# Patient Record
Sex: FEMALE | Race: WHITE | NOT HISPANIC OR LATINO | Employment: OTHER | URBAN - METROPOLITAN AREA
[De-identification: names, ages, dates, MRNs, and addresses within clinical notes are randomized per-mention and may not be internally consistent; named-entity substitution may affect disease eponyms.]

---

## 2017-10-12 ENCOUNTER — FOLLOW UP (OUTPATIENT)
Dept: URBAN - METROPOLITAN AREA CLINIC 26 | Facility: CLINIC | Age: 82
End: 2017-10-12

## 2017-10-12 VITALS — BODY MASS INDEX: 32.39 KG/M2 | HEIGHT: 60 IN | WEIGHT: 165 LBS

## 2017-10-12 DIAGNOSIS — H01.003: ICD-10-CM

## 2017-10-12 DIAGNOSIS — H02.833: ICD-10-CM

## 2017-10-12 DIAGNOSIS — H02.836: ICD-10-CM

## 2017-10-12 DIAGNOSIS — D23.11: ICD-10-CM

## 2017-10-12 DIAGNOSIS — H40.023: ICD-10-CM

## 2017-10-12 DIAGNOSIS — H01.006: ICD-10-CM

## 2017-10-12 DIAGNOSIS — D31.32: ICD-10-CM

## 2017-10-12 DIAGNOSIS — H35.3112: ICD-10-CM

## 2017-10-12 DIAGNOSIS — H04.123: ICD-10-CM

## 2017-10-12 DIAGNOSIS — H35.373: ICD-10-CM

## 2017-10-12 PROCEDURE — 92250 FUNDUS PHOTOGRAPHY W/I&R: CPT

## 2017-10-12 PROCEDURE — 92014 COMPRE OPH EXAM EST PT 1/>: CPT

## 2017-10-12 ASSESSMENT — VISUAL ACUITY
OS_SC: 20/50-2
OS_PH: 20/25-1
OD_SC: 20/25-2

## 2017-10-12 ASSESSMENT — TONOMETRY
OD_IOP_MMHG: 20
OS_IOP_MMHG: 20

## 2018-02-13 ENCOUNTER — APPOINTMENT (RX ONLY)
Dept: URBAN - METROPOLITAN AREA CLINIC 16 | Facility: CLINIC | Age: 83
Setting detail: DERMATOLOGY
End: 2018-02-13

## 2018-02-13 DIAGNOSIS — L82.1 OTHER SEBORRHEIC KERATOSIS: ICD-10-CM

## 2018-02-13 DIAGNOSIS — D485 NEOPLASM OF UNCERTAIN BEHAVIOR OF SKIN: ICD-10-CM

## 2018-02-13 PROBLEM — D48.5 NEOPLASM OF UNCERTAIN BEHAVIOR OF SKIN: Status: ACTIVE | Noted: 2018-02-13

## 2018-02-13 PROCEDURE — ? REFERRAL

## 2018-02-13 PROCEDURE — ? COUNSELING

## 2018-02-13 PROCEDURE — ? PRESCRIPTION

## 2018-02-13 PROCEDURE — 99202 OFFICE O/P NEW SF 15 MIN: CPT

## 2018-02-13 RX ORDER — MUPIROCIN 20 MG/G
OINTMENT TOPICAL
Qty: 1 | Refills: 0 | Status: ERX | COMMUNITY
Start: 2018-02-13

## 2018-02-13 RX ADMIN — MUPIROCIN: 20 OINTMENT TOPICAL at 20:11

## 2018-02-13 ASSESSMENT — LOCATION SIMPLE DESCRIPTION DERM
LOCATION SIMPLE: RIGHT ZYGOMA
LOCATION SIMPLE: LEFT CHEEK

## 2018-02-13 ASSESSMENT — LOCATION ZONE DERM: LOCATION ZONE: FACE

## 2018-02-13 ASSESSMENT — LOCATION DETAILED DESCRIPTION DERM
LOCATION DETAILED: LEFT INFERIOR CENTRAL MALAR CHEEK
LOCATION DETAILED: RIGHT CENTRAL ZYGOMA

## 2018-02-13 NOTE — HPI: EVALUATION OF SKIN LESION(S)
How Severe Are Your Spot(S)?: mild
Have Your Spot(S) Been Treated In The Past?: has not been treated
Hpi Title: Evaluation of a Skin Lesion
Additional History: Pt was prescribed a topical  from Dr. Jorge Ramirez. but canât remember what it was called.

## 2018-11-06 ENCOUNTER — FOLLOW UP (OUTPATIENT)
Dept: URBAN - METROPOLITAN AREA CLINIC 26 | Facility: CLINIC | Age: 83
End: 2018-11-06

## 2018-11-06 VITALS — HEIGHT: 60 IN | BODY MASS INDEX: 33.77 KG/M2 | WEIGHT: 172 LBS

## 2018-11-06 DIAGNOSIS — D23.112: ICD-10-CM

## 2018-11-06 DIAGNOSIS — D31.32: ICD-10-CM

## 2018-11-06 DIAGNOSIS — H35.373: ICD-10-CM

## 2018-11-06 DIAGNOSIS — H35.3112: ICD-10-CM

## 2018-11-06 DIAGNOSIS — H40.023: ICD-10-CM

## 2018-11-06 PROCEDURE — 92134 CPTRZ OPH DX IMG PST SGM RTA: CPT

## 2018-11-06 PROCEDURE — 92250 FUNDUS PHOTOGRAPHY W/I&R: CPT

## 2018-11-06 PROCEDURE — 92014 COMPRE OPH EXAM EST PT 1/>: CPT

## 2018-11-06 ASSESSMENT — TONOMETRY
OS_IOP_MMHG: 19
OD_IOP_MMHG: 18

## 2018-11-06 ASSESSMENT — VISUAL ACUITY
OS_SC: 20/30-2
OD_SC: 20/25-1

## 2019-12-18 ENCOUNTER — FOLLOW UP (OUTPATIENT)
Dept: URBAN - METROPOLITAN AREA CLINIC 26 | Facility: CLINIC | Age: 84
End: 2019-12-18

## 2019-12-18 VITALS — HEIGHT: 58 IN | WEIGHT: 155 LBS | BODY MASS INDEX: 32.54 KG/M2

## 2019-12-18 DIAGNOSIS — H02.836: ICD-10-CM

## 2019-12-18 DIAGNOSIS — H01.003: ICD-10-CM

## 2019-12-18 DIAGNOSIS — D31.32: ICD-10-CM

## 2019-12-18 DIAGNOSIS — H40.023: ICD-10-CM

## 2019-12-18 DIAGNOSIS — H04.123: ICD-10-CM

## 2019-12-18 DIAGNOSIS — D23.112: ICD-10-CM

## 2019-12-18 DIAGNOSIS — H01.006: ICD-10-CM

## 2019-12-18 DIAGNOSIS — H02.833: ICD-10-CM

## 2019-12-18 DIAGNOSIS — H35.3112: ICD-10-CM

## 2019-12-18 DIAGNOSIS — H35.373: ICD-10-CM

## 2019-12-18 PROCEDURE — 92250 FUNDUS PHOTOGRAPHY W/I&R: CPT

## 2019-12-18 PROCEDURE — 92014 COMPRE OPH EXAM EST PT 1/>: CPT

## 2019-12-18 PROCEDURE — 92134 CPTRZ OPH DX IMG PST SGM RTA: CPT

## 2019-12-18 ASSESSMENT — VISUAL ACUITY
OS_SC: 20/30-1
OD_SC: 20/30+1

## 2019-12-18 ASSESSMENT — TONOMETRY
OD_IOP_MMHG: 13
OS_IOP_MMHG: 15

## 2020-03-02 ENCOUNTER — UNSCHEDULED FOLLOW UP (OUTPATIENT)
Dept: URBAN - METROPOLITAN AREA CLINIC 26 | Facility: CLINIC | Age: 85
End: 2020-03-02

## 2020-03-02 DIAGNOSIS — H04.123: ICD-10-CM

## 2020-03-02 DIAGNOSIS — H01.003: ICD-10-CM

## 2020-03-02 DIAGNOSIS — H40.023: ICD-10-CM

## 2020-03-02 DIAGNOSIS — H35.373: ICD-10-CM

## 2020-03-02 DIAGNOSIS — D23.112: ICD-10-CM

## 2020-03-02 DIAGNOSIS — H02.836: ICD-10-CM

## 2020-03-02 DIAGNOSIS — H01.023: ICD-10-CM

## 2020-03-02 DIAGNOSIS — H35.3112: ICD-10-CM

## 2020-03-02 DIAGNOSIS — H01.026: ICD-10-CM

## 2020-03-02 DIAGNOSIS — H02.833: ICD-10-CM

## 2020-03-02 DIAGNOSIS — D31.32: ICD-10-CM

## 2020-03-02 DIAGNOSIS — H01.006: ICD-10-CM

## 2020-03-02 DIAGNOSIS — D23.122: ICD-10-CM

## 2020-03-02 PROCEDURE — 92012 INTRM OPH EXAM EST PATIENT: CPT

## 2020-03-02 ASSESSMENT — TONOMETRY
OS_IOP_MMHG: 20
OD_IOP_MMHG: 10

## 2020-03-02 ASSESSMENT — VISUAL ACUITY
OS_SC: 20/50+2
OD_SC: 20/40-2
OS_PH: 20/30

## 2020-05-30 ENCOUNTER — UNSCHEDULED FOLLOW UP (OUTPATIENT)
Dept: URBAN - METROPOLITAN AREA CLINIC 26 | Facility: CLINIC | Age: 85
End: 2020-05-30

## 2020-05-30 DIAGNOSIS — H20.011: ICD-10-CM

## 2020-05-30 DIAGNOSIS — H40.023: ICD-10-CM

## 2020-05-30 DIAGNOSIS — H35.3112: ICD-10-CM

## 2020-05-30 DIAGNOSIS — D31.32: ICD-10-CM

## 2020-05-30 DIAGNOSIS — H35.373: ICD-10-CM

## 2020-05-30 PROCEDURE — 92014 COMPRE OPH EXAM EST PT 1/>: CPT

## 2020-05-30 PROCEDURE — 92250 FUNDUS PHOTOGRAPHY W/I&R: CPT

## 2020-05-30 PROCEDURE — 92134 CPTRZ OPH DX IMG PST SGM RTA: CPT

## 2020-05-30 RX ORDER — CYCLOPENTOLATE HYDROCHLORIDE 20 MG/ML: 1 SOLUTION/ DROPS OPHTHALMIC TWICE A DAY

## 2020-05-30 RX ORDER — PREDNISOLONE ACETATE 10 MG/ML: 1 SUSPENSION/ DROPS OPHTHALMIC

## 2020-05-30 ASSESSMENT — TONOMETRY: OD_IOP_MMHG: 14

## 2020-05-30 ASSESSMENT — VISUAL ACUITY: OD_SC: 20/60+1

## 2020-06-04 ENCOUNTER — FOLLOW UP (OUTPATIENT)
Dept: URBAN - METROPOLITAN AREA CLINIC 26 | Facility: CLINIC | Age: 85
End: 2020-06-04

## 2020-06-04 DIAGNOSIS — D31.32: ICD-10-CM

## 2020-06-04 DIAGNOSIS — H20.011: ICD-10-CM

## 2020-06-04 DIAGNOSIS — H35.3112: ICD-10-CM

## 2020-06-04 DIAGNOSIS — H35.373: ICD-10-CM

## 2020-06-04 DIAGNOSIS — H40.023: ICD-10-CM

## 2020-06-04 PROCEDURE — 92014 COMPRE OPH EXAM EST PT 1/>: CPT

## 2020-06-04 PROCEDURE — 92134 CPTRZ OPH DX IMG PST SGM RTA: CPT

## 2020-06-04 PROCEDURE — 92250 FUNDUS PHOTOGRAPHY W/I&R: CPT

## 2020-06-04 ASSESSMENT — TONOMETRY
OS_IOP_MMHG: 19
OD_IOP_MMHG: 16

## 2020-06-04 ASSESSMENT — VISUAL ACUITY
OD_SC: 20/50+1
OS_SC: 20/30+1

## 2020-07-07 ENCOUNTER — FOLLOW UP (OUTPATIENT)
Dept: URBAN - METROPOLITAN AREA CLINIC 26 | Facility: CLINIC | Age: 85
End: 2020-07-07

## 2020-07-07 DIAGNOSIS — H01.023: ICD-10-CM

## 2020-07-07 DIAGNOSIS — H35.3112: ICD-10-CM

## 2020-07-07 DIAGNOSIS — H40.023: ICD-10-CM

## 2020-07-07 DIAGNOSIS — D31.32: ICD-10-CM

## 2020-07-07 DIAGNOSIS — H20.011: ICD-10-CM

## 2020-07-07 DIAGNOSIS — H01.006: ICD-10-CM

## 2020-07-07 DIAGNOSIS — H01.026: ICD-10-CM

## 2020-07-07 DIAGNOSIS — H04.123: ICD-10-CM

## 2020-07-07 DIAGNOSIS — H02.836: ICD-10-CM

## 2020-07-07 DIAGNOSIS — H02.833: ICD-10-CM

## 2020-07-07 DIAGNOSIS — H35.373: ICD-10-CM

## 2020-07-07 DIAGNOSIS — H01.003: ICD-10-CM

## 2020-07-07 PROCEDURE — 92250 FUNDUS PHOTOGRAPHY W/I&R: CPT

## 2020-07-07 PROCEDURE — 92014 COMPRE OPH EXAM EST PT 1/>: CPT

## 2020-07-07 ASSESSMENT — VISUAL ACUITY
OD_SC: 20/40-1
OS_SC: 20/25-2

## 2020-07-07 ASSESSMENT — TONOMETRY
OD_IOP_MMHG: 20
OS_IOP_MMHG: 21

## 2021-03-09 ENCOUNTER — FOLLOW UP (OUTPATIENT)
Dept: URBAN - METROPOLITAN AREA CLINIC 26 | Facility: CLINIC | Age: 86
End: 2021-03-09

## 2021-03-09 VITALS — HEIGHT: 59 IN | WEIGHT: 144 LBS | BODY MASS INDEX: 29.03 KG/M2

## 2021-03-09 DIAGNOSIS — H40.023: ICD-10-CM

## 2021-03-09 DIAGNOSIS — D31.32: ICD-10-CM

## 2021-03-09 DIAGNOSIS — H20.011: ICD-10-CM

## 2021-03-09 DIAGNOSIS — H35.3112: ICD-10-CM

## 2021-03-09 DIAGNOSIS — H35.373: ICD-10-CM

## 2021-03-09 PROCEDURE — 92134 CPTRZ OPH DX IMG PST SGM RTA: CPT

## 2021-03-09 PROCEDURE — 92014 COMPRE OPH EXAM EST PT 1/>: CPT

## 2021-03-09 ASSESSMENT — TONOMETRY
OD_IOP_MMHG: 19
OS_IOP_MMHG: 20
OD_IOP_MMHG: 24

## 2021-03-09 ASSESSMENT — VISUAL ACUITY
OD_PH: 20/30+
OD_SC: 20/40+2
OS_SC: 20/25-2

## 2021-04-21 ENCOUNTER — UNSCHEDULED FOLLOW UP (OUTPATIENT)
Dept: URBAN - METROPOLITAN AREA CLINIC 26 | Facility: CLINIC | Age: 86
End: 2021-04-21

## 2021-04-21 VITALS — HEIGHT: 55 IN | SYSTOLIC BLOOD PRESSURE: 103 MMHG | DIASTOLIC BLOOD PRESSURE: 72 MMHG | HEART RATE: 66 BPM

## 2021-04-21 DIAGNOSIS — H20.011: ICD-10-CM

## 2021-04-21 DIAGNOSIS — H35.373: ICD-10-CM

## 2021-04-21 DIAGNOSIS — D31.32: ICD-10-CM

## 2021-04-21 DIAGNOSIS — H40.023: ICD-10-CM

## 2021-04-21 DIAGNOSIS — H35.3112: ICD-10-CM

## 2021-04-21 DIAGNOSIS — G45.3: ICD-10-CM

## 2021-04-21 PROCEDURE — 92134 CPTRZ OPH DX IMG PST SGM RTA: CPT

## 2021-04-21 PROCEDURE — 92014 COMPRE OPH EXAM EST PT 1/>: CPT

## 2021-04-21 PROCEDURE — 92235 FLUORESCEIN ANGRPH MLTIFRAME: CPT

## 2021-04-21 PROCEDURE — 92250 FUNDUS PHOTOGRAPHY W/I&R: CPT

## 2021-04-21 ASSESSMENT — VISUAL ACUITY
OS_SC: 20/100-2
OD_SC: 20/50+2

## 2021-04-21 ASSESSMENT — TONOMETRY
OD_IOP_MMHG: 18
OS_IOP_MMHG: 18

## 2021-05-26 ENCOUNTER — FOLLOW UP (OUTPATIENT)
Dept: URBAN - METROPOLITAN AREA CLINIC 26 | Facility: CLINIC | Age: 86
End: 2021-05-26

## 2021-05-26 DIAGNOSIS — G45.3: ICD-10-CM

## 2021-05-26 DIAGNOSIS — H40.023: ICD-10-CM

## 2021-05-26 DIAGNOSIS — H35.373: ICD-10-CM

## 2021-05-26 DIAGNOSIS — H35.3112: ICD-10-CM

## 2021-05-26 DIAGNOSIS — H20.011: ICD-10-CM

## 2021-05-26 DIAGNOSIS — D31.32: ICD-10-CM

## 2021-05-26 DIAGNOSIS — H57.13: ICD-10-CM

## 2021-05-26 PROCEDURE — 92250 FUNDUS PHOTOGRAPHY W/I&R: CPT

## 2021-05-26 PROCEDURE — 92134 CPTRZ OPH DX IMG PST SGM RTA: CPT

## 2021-05-26 PROCEDURE — 92014 COMPRE OPH EXAM EST PT 1/>: CPT

## 2021-05-26 ASSESSMENT — VISUAL ACUITY
OD_SC: 20/30
OS_SC: 20/200+1
OS_PH: 20/30-1

## 2021-05-26 ASSESSMENT — TONOMETRY
OS_IOP_MMHG: 20
OD_IOP_MMHG: 19

## 2021-06-17 ENCOUNTER — OFFICE VISIT (OUTPATIENT)
Dept: URBAN - METROPOLITAN AREA CLINIC 63 | Facility: CLINIC | Age: 86
End: 2021-06-17

## 2022-07-09 ENCOUNTER — TELEPHONE ENCOUNTER (OUTPATIENT)
Dept: URBAN - METROPOLITAN AREA CLINIC 121 | Facility: CLINIC | Age: 87
End: 2022-07-09

## 2022-07-09 RX ORDER — NITROFURANTOIN 100 MG/1
CAPSULE ORAL ONCE A DAY
Refills: 0 | OUTPATIENT
Start: 2019-04-12 | End: 2019-06-19

## 2022-07-10 ENCOUNTER — TELEPHONE ENCOUNTER (OUTPATIENT)
Dept: URBAN - METROPOLITAN AREA CLINIC 121 | Facility: CLINIC | Age: 87
End: 2022-07-10

## 2022-07-10 RX ORDER — TELMISARTAN AND HYDROCHLOROTHIAZIDE 80; 12.5 MG/1; MG/1
TABLET ORAL ONCE A DAY
Refills: 0 | Status: ACTIVE | COMMUNITY
Start: 2019-04-24

## 2022-07-10 RX ORDER — MONTELUKAST 10 MG/1
TABLET, FILM COATED ORAL ONCE A DAY
Refills: 0 | Status: ACTIVE | COMMUNITY
Start: 2019-04-18

## 2022-07-10 RX ORDER — CITALOPRAM 40 MG/1
TABLET ORAL TAKE AS DIRECTED
Refills: 0 | Status: ACTIVE | COMMUNITY
Start: 2019-04-24

## 2022-07-10 RX ORDER — UBIDECARENONE 200 MG
CAPSULE ORAL ONCE A DAY
Refills: 0 | Status: ACTIVE | COMMUNITY
Start: 2019-04-24

## 2022-07-10 RX ORDER — VITAM B12 100 MCG
TAB ORAL TAKE AS DIRECTED
Refills: 0 | Status: ACTIVE | COMMUNITY
Start: 2019-04-24

## 2022-07-10 RX ORDER — CHOLECALCIFEROL (VITAMIN D3) 50 MCG
TABLET ORAL ONCE A DAY
Refills: 0 | Status: ACTIVE | COMMUNITY
Start: 2019-04-24

## 2022-07-10 RX ORDER — PREDNISONE 1 MG/1
TABLET ORAL TAKE AS DIRECTED
Refills: 0 | Status: ACTIVE | COMMUNITY
Start: 2019-04-24